# Patient Record
Sex: FEMALE | Race: AMERICAN INDIAN OR ALASKA NATIVE
[De-identification: names, ages, dates, MRNs, and addresses within clinical notes are randomized per-mention and may not be internally consistent; named-entity substitution may affect disease eponyms.]

---

## 2022-05-09 ENCOUNTER — HOSPITAL ENCOUNTER (INPATIENT)
Dept: HOSPITAL 5 - ED | Age: 42
LOS: 2 days | Discharge: HOME | DRG: 760 | End: 2022-05-11
Attending: STUDENT IN AN ORGANIZED HEALTH CARE EDUCATION/TRAINING PROGRAM | Admitting: INTERNAL MEDICINE
Payer: SELF-PAY

## 2022-05-09 DIAGNOSIS — D62: ICD-10-CM

## 2022-05-09 DIAGNOSIS — N83.201: ICD-10-CM

## 2022-05-09 DIAGNOSIS — D25.9: Primary | ICD-10-CM

## 2022-05-09 LAB
%HYPO/RBC NFR BLD AUTO: (no result) %
ALBUMIN SERPL-MCNC: 4.5 G/DL (ref 3.9–5)
ALT SERPL-CCNC: 15 UNITS/L (ref 7–56)
ANISOCYTOSIS BLD QL SMEAR: (no result)
BAND NEUTROPHILS # (MANUAL): 0 K/MM3
BUN SERPL-MCNC: 12 MG/DL (ref 7–17)
BUN/CREAT SERPL: 17 %
CALCIUM SERPL-MCNC: 8.7 MG/DL (ref 8.4–10.2)
HCT VFR BLD CALC: 22.4 % (ref 30.3–42.9)
HEMOLYSIS INDEX: 1
HGB BLD-MCNC: 6.5 GM/DL (ref 10.1–14.3)
MCHC RBC AUTO-ENTMCNC: 29 % (ref 30–34)
MCV RBC AUTO: 62 FL (ref 79–97)
MYELOCYTES # (MANUAL): 0 K/MM3
PLATELET # BLD: 330 K/MM3 (ref 140–440)
PROMYELOCYTES # (MANUAL): 0 K/MM3
RBC # BLD AUTO: 3.59 M/MM3 (ref 3.65–5.03)
TOTAL CELLS COUNTED BLD: 100

## 2022-05-09 PROCEDURE — 86900 BLOOD TYPING SEROLOGIC ABO: CPT

## 2022-05-09 PROCEDURE — 86920 COMPATIBILITY TEST SPIN: CPT

## 2022-05-09 PROCEDURE — 80048 BASIC METABOLIC PNL TOTAL CA: CPT

## 2022-05-09 PROCEDURE — 84703 CHORIONIC GONADOTROPIN ASSAY: CPT

## 2022-05-09 PROCEDURE — 86850 RBC ANTIBODY SCREEN: CPT

## 2022-05-09 PROCEDURE — P9016 RBC LEUKOCYTES REDUCED: HCPCS

## 2022-05-09 PROCEDURE — 36415 COLL VENOUS BLD VENIPUNCTURE: CPT

## 2022-05-09 PROCEDURE — 80053 COMPREHEN METABOLIC PANEL: CPT

## 2022-05-09 PROCEDURE — 85014 HEMATOCRIT: CPT

## 2022-05-09 PROCEDURE — 76856 US EXAM PELVIC COMPLETE: CPT

## 2022-05-09 PROCEDURE — 85025 COMPLETE CBC W/AUTO DIFF WBC: CPT

## 2022-05-09 PROCEDURE — 76830 TRANSVAGINAL US NON-OB: CPT

## 2022-05-09 PROCEDURE — 85018 HEMOGLOBIN: CPT

## 2022-05-09 PROCEDURE — 99285 EMERGENCY DEPT VISIT HI MDM: CPT

## 2022-05-09 PROCEDURE — 81001 URINALYSIS AUTO W/SCOPE: CPT

## 2022-05-09 PROCEDURE — 86901 BLOOD TYPING SEROLOGIC RH(D): CPT

## 2022-05-09 PROCEDURE — 74177 CT ABD & PELVIS W/CONTRAST: CPT

## 2022-05-09 PROCEDURE — 85007 BL SMEAR W/DIFF WBC COUNT: CPT

## 2022-05-09 NOTE — EMERGENCY DEPARTMENT REPORT
ED Abdominal Pain HPI





- General


Chief Complaint: Abdominal Pain


Stated Complaint: ABDOMINAL PAIN


Time Seen by Provider: 05/09/22 22:51


Source: patient


Mode of arrival: Ambulatory


Limitations: No Limitations





- History of Present Illness


Initial Comments: 





41-year-old female presenting with bilateral lower abdominal pain that has been 

going on for couple of days progressive getting worse.  Patient reports last 

bowel movement to be today and was normal.  Patient denies any constipation or 

diarrhea.  No fever or chills reported.  Patient also mention some rash on the 

lower lips that has been getting worse for the last 3 months.  Patient could not

remember eating or having contact with something unusual.  No other modifying or

associated factors reported.





- Related Data


                                Home Medications











 Medication  Instructions  Recorded  Confirmed  Last Taken


 


No Known Home Medications [No  05/10/22 05/10/22 Unknown





Reported Home Medications]    











                                    Allergies











Allergy/AdvReac Type Severity Reaction Status Date / Time


 


No Known Allergies Allergy   Verified 05/10/22 01:39














ED Review of Systems


ROS: 


Stated complaint: ABDOMINAL PAIN


Other details as noted in HPI





Comment: All other systems reviewed and negative


Gastrointestinal: abdominal pain.  denies: nausea, vomiting, constipation, 

hematemesis


Skin: rash (Lower lip for the last 3 months)





ED Past Medical Hx





- Past Medical History


Previous Medical History?: No





- Surgical History


Past Surgical History?: No





- Medications


Home Medications: 


                                Home Medications











 Medication  Instructions  Recorded  Confirmed  Last Taken  Type


 


No Known Home Medications [No  05/10/22 05/10/22 Unknown History





Reported Home Medications]     














ED Physical Exam





- General


Limitations: No Limitations


General appearance: alert, in no apparent distress





- Head


Head exam: Present: normal inspection





- Eye


Eye exam: Present: normal appearance


Pupils: Present: normal accommodation





- ENT


ENT exam: Present: normal exam, normal orophraynx, other (maculopapular rash 

under lower lip extending from right to left angle of the mouth)





- Neck


Neck exam: Present: normal inspection, full ROM





- Respiratory


Respiratory exam: Present: normal lung sounds bilaterally.  Absent: respiratory 

distress, accessory muscle use





- Cardiovascular


Cardiovascular Exam: Present: regular rate, normal rhythm, normal heart sounds





- GI/Abdominal


GI/Abdominal exam: Present: soft, tenderness (bilateral lower and suprapubic 

tenderness with a rigid/solid mass suprapubic area)





- Extremities Exam


Extremities exam: Present: normal inspection, normal capillary refill





- Back Exam


Back exam: Present: tenderness.  Absent: CVA tenderness (R), CVA tenderness (L)





- Neurological Exam


Neurological exam: Present: alert, oriented X3





- Psychiatric


Psychiatric exam: Present: normal affect, normal mood





- Skin


Skin exam: Present: rash, erythema (maculopapular rash under lower lip extending

from right to left angle of the mouth)





ED Course


                                   Vital Signs











  05/09/22 05/10/22 05/10/22





  16:52 01:49 01:54


 


Temperature 98.4 F 98.8 F 98.8 F


 


Pulse Rate 85 77 75


 


Respiratory 18 18 16





Rate   


 


Blood Pressure  127/52 127/69


 


Blood Pressure 130/78  





[Right]   


 


O2 Sat by Pulse 99 97 100





Oximetry   














  05/10/22 05/10/22 05/10/22





  01:59 02:04 03:16


 


Temperature 98.8 F 98.8 F 99.0 F


 


Pulse Rate 72 68 72


 


Respiratory 16 16 18





Rate   


 


Blood Pressure 129/73 126/72 147/80


 


Blood Pressure   





[Right]   


 


O2 Sat by Pulse 100 100 98





Oximetry   














  05/10/22 05/10/22 05/10/22





  03:28 07:03 07:57


 


Temperature 99 F  98.1 F


 


Pulse Rate 67  59 L


 


Respiratory 16  17





Rate   


 


Blood Pressure 134/56  138/71


 


Blood Pressure   





[Right]   


 


O2 Sat by Pulse 100 99 100





Oximetry   














- Reevaluation(s)


Reevaluation #1: 





05/09/22 23:06


here with lower abdominal pain and noted with bilateral lower abdominal 

tenderness to palpation with hard solid palpation to the suprapubic area-- will 

go ahead and get routine labs including CBC and CMP with UA for any electrolyte 

abnormality or any infectious process, will also check the CT scan of the 

abdomen for further evaluation and the hard felt mass bilateral lower abdomen--


Reevaluation #2: 





05/09/22 23:09


Noted with low H&H 6.5/22.4 and MCV 62 -- no appreciable source of bleeding -- w

ill consider admission and follow up on the CT abd/pel 


Reevaluation #3: 





05/10/22 02:16


CT abd/pel noted with very large fibroid uterus, and fair right ruptured ovary 

cyst--with this patient acute anemia that is likely of bleeding from the 

ruptured ovarian cyst-- will consult with Ob/Gyn for further treatment and 

disposition. 


05/10/22 05:01


Dr Real consulted who accept patient for further evaluation and treatment. Dr Hinton the Ob/Gyn was consulted who plan to visit with patient in the AM. He 

also wanted a repeat H&H and be updated to see if patient is actively bleeding 

for surgery consideration. 





ED Medical Decision Making





- Lab Data


Result diagrams: 


                                 05/10/22 13:06





                                 05/09/22 17:00


Critical care attestation.: 


If time is entered above; I have spent that time in minutes in the direct care 

of this critically ill patient, excluding procedure time.








ED Disposition


Clinical Impression: 


 Abdominal pain, left lower quadrant, Suprapubic abdominal pain, Rupture of cyst

 of right ovary





Abdominal pain


Qualifiers:


 Abdominal location: unspecified location Qualified Code(s): R10.9 - Unspecified

 abdominal pain





Uterine fibroid


Qualifiers:


 Uterine leiomyoma location: unspecified location Qualified Code(s): D25.9 - 

Leiomyoma of uterus, unspecified





Disposition: 09 ADMITTED AS INPATIENT


Is pt being admited?: Yes


Does the pt Need Aspirin: No


Condition: Stable

## 2022-05-10 LAB
HCT VFR BLD CALC: 33.6 % (ref 30.3–42.9)
HGB BLD-MCNC: 9.9 GM/DL (ref 10.1–14.3)

## 2022-05-10 RX ADMIN — TRIAMCINOLONE ACETONIDE SCH APPLIC: 1 CREAM TOPICAL at 12:18

## 2022-05-10 RX ADMIN — Medication SCH ML: at 12:18

## 2022-05-10 RX ADMIN — Medication SCH ML: at 21:09

## 2022-05-10 RX ADMIN — TRIAMCINOLONE ACETONIDE SCH APPLIC: 1 CREAM TOPICAL at 21:09

## 2022-05-10 NOTE — EVENT NOTE
Date: 05/10/22





Patient seen and examined.  No longer having abdominal pain.  Has been seen by 

general surgery and awaiting gynecology evaluation.  Patient reports history of 

anemia in the past and her hemoglobin improved to 9.1 after receiving 2 units of

blood.  Will not order iron studies at this time due to scheduling that may be 

possible from receiving blood products.  We will continue with IVFs, as needed 

pain control and further recommendations from gynecology.

## 2022-05-10 NOTE — CONSULTATION
History of Present Illness


Consult date: 05/10/22


Reason for consult: abdominal pain


Chief complaint: 





abd pain





- History of present illness


History of present illness: 





40 yo F with PMHx of anemia who presents to ER with 1 day history of squeezing 

LLQ abdominal pain. Patient states she has never had pain like this in the past.

Pain started suddenly and became severe which prompted coming to ER. No 

alleviating or exacerbating factors. She did not take anything at home for the 

pain. Pain is localized and does not radiate. No f/c, cp, sob, n/v, c/d. She is 

hungry. Pain is not almost resolved without intervention.





Pt denies heavy menstrual periods. Was diagnosed with anemia 4 years ago but not

worked up further. Has not had labs since then.





Past History


Past Medical History: No medical history


Past Surgical History: No surgical history


Social history: alcohol abuse (Drinks alcohol almost every weekend.)


Family history: no significant family history





Medications and Allergies


                                    Allergies











Allergy/AdvReac Type Severity Reaction Status Date / Time


 


No Known Allergies Allergy   Verified 05/10/22 01:39











                                Home Medications











 Medication  Instructions  Recorded  Confirmed  Last Taken  Type


 


No Known Home Medications [No  05/10/22 05/10/22 Unknown History





Reported Home Medications]     











Active Meds: 


Active Medications





Acetaminophen (Acetaminophen 325 Mg Tab)  650 mg PO Q4H PRN


   PRN Reason: Pain MILD(1-3)/Fever >100.5/HA


Sodium Chloride (Nacl 0.9% 1000 Ml)  1,000 mls @ 125 mls/hr IV AS DIRECT MYAH


Magnesium Hydroxide (Magnesium Hydroxide (Mom) Oral Liqd Udc)  30 ml PO Q4H PRN


   PRN Reason: Constipation


Morphine Sulfate (Morphine 2 Mg/1 Ml Inj)  2 mg IV Q4H PRN


   PRN Reason: Pain, Moderate (4-6)


Morphine Sulfate (Morphine 4 Mg/1 Ml Inj)  4 mg IV Q4H PRN


   PRN Reason: Pain , Severe (7-10)


Ondansetron HCl (Ondansetron 4 Mg/2 Ml Inj)  4 mg IV Q8H PRN


   PRN Reason: Nausea And Vomiting


Sodium Chloride (Sodium Chloride 0.9% 10 Ml Flush Syringe)  10 ml IV BID MYAH


Sodium Chloride (Sodium Chloride 0.9% 10 Ml Flush Syringe)  10 ml IV PRN PRN


   PRN Reason: LINE FLUSH











Review of Systems


All systems: negative (10 pt ros performed and negative except for that listed 

in HPI)





Exam


                                   Vital Signs











Temp Pulse Resp BP Pulse Ox


 


 98.4 F   85   18   130/78   99 


 


 05/09/22 16:52  05/09/22 16:52  05/09/22 16:52  05/09/22 16:52  05/09/22 16:52











Narrative exam: 





Gen.: Awake, alert, oriented x3.  No apparent distress


ENT: Trachea midline.  No lymphadenopathy.  No scleral icterus or conjunctival 

pallor


CV: S1, S2 present


Respiratory: No audible wheezes


Abdomen: Soft, nondistended, nontender.  No rebound, rigidity, guarding. Fu

llness/firmness of the lower abdomen to umbilicus (in location of enlarged 

uterus on CT)


Extremities: No clubbing, cyanosis, edema





Results





- Labs





                                 05/09/22 17:00





                                 05/09/22 17:00


                              Abnormal lab results











  05/09/22 05/09/22 Range/Units





  17:00 17:00 


 


RBC  3.59 L   (3.65-5.03)  M/mm3


 


Hgb  6.5 L   (10.1-14.3)  gm/dl


 


Hct  22.4 L   (30.3-42.9)  %


 


MCV  62 L   (79-97)  fl


 


MCH  18 L   (28-32)  pg


 


MCHC  29 L   (30-34)  %


 


RDW  21.2 H   (13.2-15.2)  %


 


Seg Neuts % (Manual)  36.0 L   (40.0-70.0)  %


 


Lymphocytes % (Manual)  57.0 H   (13.4-35.0)  %


 


Potassium   5.2 H  (3.6-5.0)  mmol/L








                                 Diabetes panel











  05/09/22 Range/Units





  17:00 


 


Sodium  139  (137-145)  mmol/L


 


Potassium  5.2 H  (3.6-5.0)  mmol/L


 


Chloride  104.1  ()  mmol/L


 


Carbon Dioxide  23  (22-30)  mmol/L


 


BUN  12  (7-17)  mg/dL


 


Creatinine  0.7  (0.6-1.2)  mg/dL


 


Glucose  88  ()  mg/dL


 


Calcium  8.7  (8.4-10.2)  mg/dL


 


AST  24  (5-40)  units/L


 


ALT  15  (7-56)  units/L


 


Alkaline Phosphatase  47  ()  units/L


 


Total Protein  7.1  (6.3-8.2)  g/dL


 


Albumin  4.5  (3.9-5)  g/dL








                                  Calcium panel











  05/09/22 Range/Units





  17:00 


 


Calcium  8.7  (8.4-10.2)  mg/dL


 


Albumin  4.5  (3.9-5)  g/dL








                                 Pituitary panel











  05/09/22 Range/Units





  17:00 


 


Sodium  139  (137-145)  mmol/L


 


Potassium  5.2 H  (3.6-5.0)  mmol/L


 


Chloride  104.1  ()  mmol/L


 


Carbon Dioxide  23  (22-30)  mmol/L


 


BUN  12  (7-17)  mg/dL


 


Creatinine  0.7  (0.6-1.2)  mg/dL


 


Glucose  88  ()  mg/dL


 


Calcium  8.7  (8.4-10.2)  mg/dL








                                  Adrenal panel











  05/09/22 Range/Units





  17:00 


 


Sodium  139  (137-145)  mmol/L


 


Potassium  5.2 H  (3.6-5.0)  mmol/L


 


Chloride  104.1  ()  mmol/L


 


Carbon Dioxide  23  (22-30)  mmol/L


 


BUN  12  (7-17)  mg/dL


 


Creatinine  0.7  (0.6-1.2)  mg/dL


 


Glucose  88  ()  mg/dL


 


Calcium  8.7  (8.4-10.2)  mg/dL


 


Total Bilirubin  0.20  (0.1-1.2)  mg/dL


 


AST  24  (5-40)  units/L


 


ALT  15  (7-56)  units/L


 


Alkaline Phosphatase  47  ()  units/L


 


Total Protein  7.1  (6.3-8.2)  g/dL


 


Albumin  4.5  (3.9-5)  g/dL














- Imaging


CT scan - abdomen: report reviewed, image reviewed


CT scan - pelvis: report reviewed, image reviewed





Assessment and Plan





40 yo F with LLQ abd pain





Plan:


1. Diet per 1' service


2. IVF


3. prn pain control


4. GYN consult pending


5. Symptoms, exam, and Ct scan not consistent with appendicitis. Favor ruptured 

ovarian cyst. Will defer management to GYN


6. W/u of anemia per 1' service





No acute general surgery intervention.


Thank you, please call with questions.

## 2022-05-10 NOTE — CAT SCAN REPORT
CT ABDOMEN AND PELVIS WITH CONTRAST



INDICATION / CLINICAL INFORMATION: abdominal pain.



TECHNIQUE: Axial CT images were obtained through the abdomen and pelvis after 100 cc of Omnipaque 300
 IV contrast.  All CT scans at this location are performed using CT dose reduction for ALARA by means
 of automated exposure control. 



COMPARISON: None available.



FINDINGS:



LOWER CHEST: No significant abnormality.



AORTA / ARTERIES: No significant abnormality. 

IVC / VEINS: No significant abnormality.



LYMPH NODES: No significant adenopathy.



COLON: No significant abnormality. 

APPENDIX: The appendix is slightly enlarged measuring 9 mm. There is mild periappendiceal inflammatio
n. There is also enhancement of the appendiceal wall; however, a few foci of intraluminal gas are als
o noted within the appendix.  

STOMACH / SMALL BOWEL: No significant abnormality. 

PERITONEUM: No free fluid. No free air. No fluid collection.





LIVER: No significant abnormality.

GALLBLADDER: No significant abnormality.  

BILE DUCTS: No significant abnormality.



PANCREAS: No significant abnormality.



SPLEEN: No significant abnormality.



ADRENALS: No significant abnormality.



RIGHT KIDNEY / URETER: No significant abnormality.

LEFT KIDNEY / URETER: No significant abnormality.



URINARY BLADDER: No significant abnormality.



REPRODUCTIVE ORGANS: There is an enlarged fibroid uterus measuring 10.5 x 13.3 x 18.3 cm. Along the r
ight side of the uterus there is a soft tissue density, likely representing the right ovary. Adjacent
 to this there is a small amount of fluid.



SKELETAL SYSTEM: No significant abnormality.



ADDITIONAL FINDINGS: None.



IMPRESSION:

1. There is an enlarged fibroid uterus measuring 10.5 x 13.3 x 18.3 cm.

2. Along the right aspect of the fibroid uterus there is soft tissue density likely representing the 
ovary. Adjacent to this there is small amount of free fluid. The free fluid may be secondary to a rup
tured ovarian cyst. Adjacent to the presumed right ovary is the appendix which demonstrates mild mauri
appendiceal inflammation as well as enhancement of the appendiceal wall. There are a few foci of gas 
within the appendiceal lumen which may acute appendicitis less likely and therefore the leading diagn
osis is a ruptured ovarian cyst leading to periappendiceal inflammation. 



Signer Name: Marito Fernandes DO 

Signed: 5/10/2022 1:40 AM

Workstation Name: [id number]

## 2022-05-10 NOTE — HISTORY AND PHYSICAL REPORT
History of Present Illness


Date of examination: 05/10/22


Date of admission: 


05/10/2022


Chief complaint: 





Abdominal Pain


History of present illness: 





41-year-old -American female with no significant past medical history 

presenting to the emergency room today complaining of abdominal pain which has 

been ongoing for the past few days.  Abdominal pain has been progressively 

getting worse.  No known relieving or exacerbating factor.  She denies any 

nausea or vomiting, no constipation or diarrhea, no fever or chills, no chest 

pain or shortness of breath.


Patient denies any hematuria or dysuria, denies any bright red blood per rectum,

denies any vaginal bleeding and denies any melena.





Work-up in the emergency room today, lab reveals hemoglobin level of 6.5 and 

hematocrit of 22.4.





CT of the abdomen and pelvis reveals:1. There is an enlarged fibroid uterus 

measuring 10.5 x 13.3 x 18.3 cm.  


 2. Along the right aspect of the fibroid uterus there is soft tissue density 

likely representing 


the ovary. Adjacent to this there is small amount of free fluid. The free fluid 

may be secondary to 


a ruptured ovarian cyst. Adjacent to the presumed right ovary is the appendix 

which demonstrates 


mild periappendiceal inflammation as well as enhancement of the appendiceal 

wall. There are a few 


foci of gas within the appendiceal lumen which may acute appendicitis less 

likely and therefore the 


leading diagnosis is a ruptured ovarian cyst leading to periappendiceal 

inflammation.   





Consult has been placed today gynecologist for evaluation and recommendations.


Patient is currently being transfused with packed red blood cells.





Past History


Past Medical History: No medical history


Past Surgical History: No surgical history


Social history: alcohol abuse (Drinks alcohol almost every weekend.)


Family history: no significant family history





Medications and Allergies


                                    Allergies











Allergy/AdvReac Type Severity Reaction Status Date / Time


 


No Known Allergies Allergy   Verified 05/10/22 01:39











                                Home Medications











 Medication  Instructions  Recorded  Confirmed  Last Taken  Type


 


No Known Home Medications [No  05/10/22 05/10/22 Unknown History





Reported Home Medications]     











Active Meds: 


Active Medications





Acetaminophen (Acetaminophen 325 Mg Tab)  650 mg PO Q4H PRN


   PRN Reason: Pain MILD(1-3)/Fever >100.5/HA


Morphine Sulfate (Morphine 2 Mg/1 Ml Inj)  2 mg IV Q4H PRN


   PRN Reason: Pain, Moderate (4-6)


Ondansetron HCl (Ondansetron 4 Mg/2 Ml Inj)  4 mg IV Q8H PRN


   PRN Reason: Nausea And Vomiting


Sodium Chloride (Sodium Chloride 0.9% 10 Ml Flush Syringe)  10 ml IV BID MYAH


Sodium Chloride (Sodium Chloride 0.9% 10 Ml Flush Syringe)  10 ml IV PRN PRN


   PRN Reason: LINE FLUSH











Review of Systems


Constitutional: no fever, no chills


Ears, nose, mouth and throat: no nasal congestion, no sore throat


Cardiovascular: no chest pain, no palpitations


Respiratory: no cough, no shortness of breath, no wheezing


Gastrointestinal: abdominal pain, no nausea, no vomiting, no diarrhea, no BRBPR,

no melena


Genitourinary Female: no menorrhagia, no hematuria, no abnormal vaginal bleeding


Menstruation: no period spotting


Musculoskeletal: no neck pain, no low back pain


Integumentary: no rash, no pruritis


Neurological: no headaches, no confusion


Endocrine: no polydipsia, no polyuria, no nocturia





Exam





- Constitutional


Vitals: 


                                        











Temp Pulse Resp BP Pulse Ox


 


 99 F   67   16   134/56   100 


 


 05/10/22 03:28  05/10/22 03:28  05/10/22 03:28  05/10/22 03:28  05/10/22 03:28











General appearance: Present: no acute distress, well-nourished, other (Moderate 

pallor)





- EENT


Eyes: Present: PERRL, EOM intact.  Absent: scleral icterus


ENT: hearing intact, clear oral mucosa, dentition normal





- Neck


Neck: Present: supple





- Respiratory


Respiratory effort: normal


Respiratory: bilateral: CTA





- Cardiovascular


Rhythm: regular


Heart Sounds: Present: S1 & S2.  Absent: systolic murmur, diastolic murmur, rub,

click





- Extremities


Extremities: no ischemia, pulses intact, pulses symmetrical, No edema, normal te

mperature, normal color, Full ROM


Peripheral Pulses: within normal limits





- Abdominal


General gastrointestinal: Present: soft, tender (Mild tenderness, no guarding 

and no rebound tenderness), distended (Mildly distended), normal bowel sounds, 

mass (Solid,firm mass in suprapubic region)





- Integumentary


Integumentary: Present: clear, warm, dry, normal turgor.  Absent: rash





- Musculoskeletal


Musculoskeletal: strength equal bilaterally





- Psychiatric


Psychiatric: appropriate mood/affect, intact judgment & insight, memory intact, 

cooperative





- Neurologic


Neurologic: CNII-XII intact, no focal deficits, moves all extremities





Results





- Labs


CBC & Chem 7: 


                                 05/10/22 13:06





                                 05/09/22 17:00


Labs: 


                              Abnormal lab results











  05/09/22 05/09/22 Range/Units





  17:00 17:00 


 


RBC  3.59 L   (3.65-5.03)  M/mm3


 


Hgb  6.5 L   (10.1-14.3)  gm/dl


 


Hct  22.4 L   (30.3-42.9)  %


 


MCV  62 L   (79-97)  fl


 


MCH  18 L   (28-32)  pg


 


MCHC  29 L   (30-34)  %


 


RDW  21.2 H   (13.2-15.2)  %


 


Seg Neuts % (Manual)  36.0 L   (40.0-70.0)  %


 


Lymphocytes % (Manual)  57.0 H   (13.4-35.0)  %


 


Potassium   5.2 H  (3.6-5.0)  mmol/L














Assessment and Plan





- Patient Problems


(1) Abdominal pain


Current Visit: Yes   Status: Acute   


Qualifiers: 


   Abdominal location: unspecified location   Qualified Code(s): R10.9 - 

Unspecified abdominal pain   


Plan to address problem: 


Possibly secondary to the ruptured ovarian cyst.


Will give analgesic medication as needed.


We will also place on IV fluid.








(2) Anemia


Current Visit: Yes   Status: Acute   


Plan to address problem: 


Possibly secondary to blood loss from ruptured ovarian cyst.


Will monitor CBC.


Patient currently receiving packed red blood cell transfusion.








(3) Rupture of cyst of right ovary


Current Visit: Yes   Status: Acute   


Plan to address problem: 


Consult placed to OB/GYN for evaluation and recommendations.


In view of findings on the CT scan of the abdomen and pelvis we will also a

ppreciate input from general surgery.








(4) DVT prophylaxis


Current Visit: Yes   Status: Acute   


Plan to address problem: 


Patient placed on sequential compression device.








(5) Full code status


Current Visit: Yes   Status: Acute   


Plan to address problem: 


Patient is full code.

## 2022-05-11 VITALS — SYSTOLIC BLOOD PRESSURE: 122 MMHG | DIASTOLIC BLOOD PRESSURE: 64 MMHG

## 2022-05-11 LAB
%HYPO/RBC NFR BLD AUTO: (no result) %
ANISOCYTOSIS BLD QL SMEAR: (no result)
BAND NEUTROPHILS # (MANUAL): 0 K/MM3
BILIRUB UR QL STRIP: (no result)
BLOOD UR QL VISUAL: (no result)
BUN SERPL-MCNC: 11 MG/DL (ref 7–17)
BUN/CREAT SERPL: 16 %
CALCIUM SERPL-MCNC: 8.3 MG/DL (ref 8.4–10.2)
HCT VFR BLD CALC: 29.3 % (ref 30.3–42.9)
HEMOLYSIS INDEX: 1
HGB BLD-MCNC: 8.9 GM/DL (ref 10.1–14.3)
MCHC RBC AUTO-ENTMCNC: 30 % (ref 30–34)
MCV RBC AUTO: 70 FL (ref 79–97)
MYELOCYTES # (MANUAL): 0 K/MM3
PH UR STRIP: 7 [PH] (ref 5–7)
PLATELET # BLD: 252 K/MM3 (ref 140–440)
PROMYELOCYTES # (MANUAL): 0 K/MM3
PROT UR STRIP-MCNC: (no result) MG/DL
RBC # BLD AUTO: 4.21 M/MM3 (ref 3.65–5.03)
RBC #/AREA URNS HPF: < 1 /HPF (ref 0–6)
TOTAL CELLS COUNTED BLD: 100
UROBILINOGEN UR-MCNC: < 2 MG/DL (ref ?–2)
WBC #/AREA URNS HPF: 1 /HPF (ref 0–6)

## 2022-05-11 RX ADMIN — TRIAMCINOLONE ACETONIDE SCH APPLIC: 1 CREAM TOPICAL at 10:13

## 2022-05-11 RX ADMIN — Medication SCH ML: at 10:14

## 2022-05-11 NOTE — CONSULTATION
History of Present Illness





- Reason for Consult


Consult date: 05/10/22


Anemia


Requesting physician: BRIE GRIMES





- History of Present Illness





41-year-old nulligravida female is admitted to the internal medicine-hospitalist

service for evaluation management of severe anemia.  Hemoglobin was 6.5.  She 

was transfused several units of packed red blood cells until her hemoglobin was 

9.9.





The patient denies heavy menstrual periods.  Menses are monthly and last 3 days 

long.  CT scan of the abdomen/pelvis was suspicious for a 10 cm x 13 cm x 18 cm 

uterine leiomyoma.





Past History


Past Medical History: No medical history (Past OB/GYN history: Nulligravida.  

Menarche in her late teenage years.  No STDs, abnormal Pap smears, or 

endometriosis.  Last Pap smear was in 2016, and that was allegedly normal.)


Past Surgical History: No surgical history


Social history: alcohol abuse (Drinks alcohol almost every weekend.)


Family history: no significant family history





Medications and Allergies


                                    Allergies











Allergy/AdvReac Type Severity Reaction Status Date / Time


 


No Known Allergies Allergy   Verified 05/10/22 01:39











                                Home Medications











 Medication  Instructions  Recorded  Confirmed  Last Taken  Type


 


No Known Home Medications [No  05/10/22 05/10/22 Unknown History





Reported Home Medications]     











Active Meds: 


Active Medications





Acetaminophen (Acetaminophen 325 Mg Tab)  650 mg PO Q4H PRN


   PRN Reason: Pain MILD(1-3)/Fever >100.5/HA


Sodium Chloride (Nacl 0.9% 1000 Ml)  1,000 mls @ 125 mls/hr IV AS DIRECT MYAH


Magnesium Hydroxide (Magnesium Hydroxide (Mom) Oral Liqd Udc)  30 ml PO Q4H PRN


   PRN Reason: Constipation


Morphine Sulfate (Morphine 2 Mg/1 Ml Inj)  2 mg IV Q4H PRN


   PRN Reason: Pain, Moderate (4-6)


Morphine Sulfate (Morphine 4 Mg/1 Ml Inj)  4 mg IV Q4H PRN


   PRN Reason: Pain , Severe (7-10)


Ondansetron HCl (Ondansetron 4 Mg/2 Ml Inj)  4 mg IV Q8H PRN


   PRN Reason: Nausea And Vomiting


Sodium Chloride (Sodium Chloride 0.9% 10 Ml Flush Syringe)  10 ml IV BID MYAH


   Last Admin: 05/10/22 21:09 Dose:  10 ml


   


Sodium Chloride (Sodium Chloride 0.9% 10 Ml Flush Syringe)  10 ml IV PRN PRN


   PRN Reason: LINE FLUSH


Triamcinolone Acetonide (Triamcinolone 0.1% Cream 15 Gm)  1 applic TP BID MYAH


   Last Admin: 05/10/22 21:09 Dose:  1 applic


   











Review of Systems


All systems: negative





Exam





- Constitutional


Vitals: 


                                        











Temp Pulse Resp BP Pulse Ox


 


 98.4 F   71   18   119/54   100 


 


 05/10/22 23:27  05/10/22 23:27  05/10/22 23:27  05/10/22 23:27  05/10/22 23:27











General appearance: Present: no acute distress





- EENT


Eyes: Present: PERRL, EOM intact


ENT: other (Bilateral earlobes have prosthetic gauges)





- Neck


Neck: Present: normal ROM





- Respiratory


Respiratory effort: normal





- Cardiovascular


Rhythm: regular





- Extremities


Extremities: no ischemia, No edema, Full ROM





- Abdominal


General gastrointestinal: Present: soft, non-tender


Female genitourinary: Present: deferred





- Rectal


Rectal Exam: deferred





- Integumentary


Integumentary: Present: normal turgor (Multiple tattoos all over the skin of the

body)





- Musculoskeletal


Musculoskeletal: strength equal bilaterally





- Psychiatric


Psychiatric: appropriate mood/affect





- Neurologic


Neurologic: CNII-XII intact





Results





- Labs


CBC & Chem 7: 


                                 05/10/22 13:06





                                 05/09/22 17:00


Labs: 


                              Abnormal lab results











  05/10/22 Range/Units





  13:06 


 


Hgb  9.9 L D  (10.1-14.3)  gm/dl














Assessment and Plan





- Patient Problems


(1) Anemia due to blood loss, acute


Current Visit: Yes   Status: Acute   


Plan to address problem: 


After having a hemoglobin of 6.5, the patient received several units of packed 

red blood cells.  Thereafter hemoglobin increased to 9.9.





Based on this patient's history, I do not believe the suspected uterine 

leiomyoma is the cause of this patient's acute anemia.








(2) Abnormal CT scan, pelvis


Current Visit: Yes   Status: Acute   


Plan to address problem: 


CT scan of the abdomen/pelvis was suspicious for an 18 cm uterine leiomyoma.





Pelvic ultrasound was ordered to further characterize this.








(3) Fibroid uterus


Current Visit: Yes   Status: Acute   


Plan to address problem: 


Await results of pelvic ultrasound.





I do not believe that this suspected uterine leiomyoma (if confirmed) is the 

likely cause of this patient's anemia.

## 2022-05-11 NOTE — DISCHARGE SUMMARY
Providers





- Providers


Date of Admission: 


05/10/22 05:03





Attending physician: 


SUSAN JIMENEZ MD





                                        





05/10/22 05:03


Consult to Physician [CONS] Stat 


   Comment: 


   Consulting Provider: XIANG SPAULDING


   Physician Instructions: needed repeat H&H and be notified


   Reason For Exam: Rupture ovarian cyst with acute blood loss





05/10/22 05:16


Consult to Physician [CONS] Routine 


   Comment: 


   Consulting Provider: DOMINGO BECERRA


   Physician Instructions: 


   Reason For Exam: ABDOMINAL PAIN, RUPTURED OVARIAN CYST/APPENDIX











Primary care physician: 


PRIMARY CARE MD








Hospitalization


Condition: Stable





Core Measure Documentation





- Palliative Care


Palliative Care/ Comfort Measures: Not Applicable





Exam





- Constitutional


Vitals: 


                                        











Temp Pulse Resp BP Pulse Ox


 


 99.0 F   66   18   122/64   100 


 


 05/11/22 08:27  05/11/22 08:27  05/11/22 03:20  05/11/22 08:27  05/11/22 10:00














Plan


Care Plan Goals: 


Please make an appointment and follow up with your primary care provider within 

a week. At that time please ask for a repeat of your blood levels (hemoglobin) 

and iron studies.


Please make an appointment with the gynecologist for further plans about you the

 fibroid that was discovered.


You were prescribed a medication to help prevent abnormal uterine bleeding by 

the gynecologist. 


Follow up with: 


PRIMARY CARE,MD [Primary Care Provider] - 3-5 Days


Forms:  Work/School Release Form


Prescriptions: 


medroxyPROGESTERone ACETATE [Provera] 10 mg PO QHS #30 tab


Triamcinolone 0.1% [Kenalog 0.1% CREAM] 1 applic TP BID 30 Days #1 tube

## 2022-05-11 NOTE — PROGRESS NOTE
Subjective





- Subjective


Date of service: 05/11/22


Interval history: 





Patient admitted with pelvic pain, uterine fibroid, severe anemia.


Transfused 1 unit with hemoglobin overnight


Vital signs stable patient is not hypotensive nor tachycardic


Approximately 18 cm uterine fibroid(fundal)


Would give patient Provera 10 mg p.o. nightly with outpatient follow-up for 

myomectomy versus hysterectomy


Thank you for this consult


XOCHILT Bell MD





Objective





- Vital Signs


Latest vital signs: 


                                   Vital Signs











  Temp Pulse Resp BP Pulse Ox


 


 05/11/22 08:27  99.0 F  66   122/64  100


 


 05/11/22 03:20  98.5 F  69  18  120/69  99


 


 05/10/22 23:27  98.4 F  71  18  119/54  100


 


 05/10/22 22:57      100


 


 05/10/22 20:23  98.6 F  73  18  120/71  100


 


 05/10/22 16:59  98.0 F  77  16  112/70  100








                                Intake and Output











 05/10/22 05/11/22 05/11/22





 23:59 07:59 15:59


 


Intake Total 0  


 


Balance 0  


 


Intake:   


 


  Oral 0  


 


Other:   


 


  Total, Intake Amount 0  


 


  Voiding Method Toilet  


 


  Weight  63.5 kg 








                                 Patient Weight











 05/11/22





 23:59


 


Weight 63.5 kg














- Labs


Labs: 


                              Abnormal lab results











  05/10/22 05/11/22 05/11/22 Range/Units





  13:06 04:12 04:12 


 


Hgb  9.9 L D  8.9 L   (10.1-14.3)  gm/dl


 


Hct   29.3 L   (30.3-42.9)  %


 


MCV   70 L   (79-97)  fl


 


MCH   21 L   (28-32)  pg


 


RDW   27.2 H   (13.2-15.2)  %


 


Lymphocytes % (Manual)   50.0 H   (13.4-35.0)  %


 


Chloride    108.3 H  ()  mmol/L


 


Calcium    8.3 L  (8.4-10.2)  mg/dL

## 2022-05-11 NOTE — ULTRASOUND REPORT
Transvaginal pelvic ultrasound



INDICATION: Pelvic pain



FINDINGS: Uterus measures 20.3 x 8.9 x 13.7 cm. Multiple fibroids are identified. The largest fibroid
 measures up to 9.6 cm.



Right adnexa measures 7.2 x 3.0 x 5.1 cm. Left adnexa 5.8 x 1.9 x 3.4 cm. Normal flow seen bilaterall
y minimal free fluid is seen in the pelvis



IMPRESSION:

1. Enlarged fibroid uterus

2. Bilateral ovaries are prominent however normal color Doppler flow. No evidence for torsion. Please
 see pelvic ultrasound transabdominal.



Signer Name: Mike Baker MD 

Signed: 5/11/2022 5:24 PM

Workstation Name: [id number]

## 2022-05-11 NOTE — ULTRASOUND REPORT
ULTRASOUND PELVIS  



INDICATION / CLINICAL INFORMATION: 

Anemia.



TECHNIQUE:

Transabdominal.

Duplex Color Doppler used: Yes. 



COMPARISON: 

CT from 5/10/2022.



FINDINGS:

UTERUS: Markedly enlarged fibroid uterus measuring 20.3 x 8.9 x 13.7 cm. Multiple fibroids are presen
t, largest measuring 9.6 cm in the lower uterus. Endometrial stripe is distorted by multiple fibroids
, though measures 1.0 cm, which is not thickened.



RIGHT ADNEXA: Right ovary measures 7.2 x 3.0 x 5.1 cm. No suspicious mass. Normal color Doppler blood
 flow.



LEFT ADNEXA: Left ovary measures 5.8 x 1.9 x 3.4 cm. No suspicious mass. Normal color Doppler blood f
low.



URINARY BLADDER: No significant abnormality. 

FREE FLUID: None.

ADDITIONAL FINDINGS: None.



IMPRESSION:



1. Enlarged fibroid uterus.

2. Nonspecific prominence of the bilateral ovaries with normal color Doppler flow. No evidence of tor
ania



Signer Name: Drew Thomas MD 

Signed: 5/11/2022 5:02 PM

Workstation Name: [id number]